# Patient Record
Sex: FEMALE | Race: WHITE | Employment: FULL TIME | URBAN - METROPOLITAN AREA
[De-identification: names, ages, dates, MRNs, and addresses within clinical notes are randomized per-mention and may not be internally consistent; named-entity substitution may affect disease eponyms.]

---

## 2021-10-28 ENCOUNTER — OFFICE VISIT (OUTPATIENT)
Dept: URGENT CARE | Facility: URGENT CARE | Age: 26
End: 2021-10-28
Payer: COMMERCIAL

## 2021-10-28 VITALS
OXYGEN SATURATION: 97 % | SYSTOLIC BLOOD PRESSURE: 112 MMHG | DIASTOLIC BLOOD PRESSURE: 72 MMHG | TEMPERATURE: 98.7 F | HEART RATE: 58 BPM

## 2021-10-28 DIAGNOSIS — S16.1XXA STRAIN OF NECK MUSCLE, INITIAL ENCOUNTER: Primary | ICD-10-CM

## 2021-10-28 PROCEDURE — 99203 OFFICE O/P NEW LOW 30 MIN: CPT | Performed by: PHYSICIAN ASSISTANT

## 2021-10-28 RX ORDER — NORETHINDRONE ACETATE AND ETHINYL ESTRADIOL .03; 1.5 MG/1; MG/1
TABLET ORAL
COMMUNITY
Start: 2020-11-23

## 2021-10-28 RX ORDER — IBUPROFEN 800 MG/1
800 TABLET, FILM COATED ORAL EVERY 8 HOURS PRN
Qty: 30 TABLET | Refills: 0 | Status: SHIPPED | OUTPATIENT
Start: 2021-10-28

## 2021-10-28 ASSESSMENT — ENCOUNTER SYMPTOMS: NECK PAIN: 1

## 2021-10-28 NOTE — PATIENT INSTRUCTIONS
Patient Education     Understanding Cervical Strain    There are 7 bones (vertebrae) in the neck that are part of the spine. These are called the cervical spine. Cervical strain is a medical term for neck pain. The neck has several layers of muscles. These are connected with tendons to the cervical spine and other bones. Neck pain is often the result of injury to these muscles and tendons.   Causes of cervical strain  Different types of stress on the neck can damage muscles and tendons (soft tissues) and cause cervical strain. Cervical tissues can be damaged by:     The neck being forced past its normal range of motion, such as in a car accident or sports injury    Constant, low-level stress, such as from poor posture or a poorly set-up workspace  Symptoms of cervical strain  These may include:    Neck pain or stiffness    Pain in the shoulders or upper back    Muscle spasms    Headache, often starting at the base of the neck    Irritability, trouble concentrating, or sleeplessness    Numbness in the arm or hand    Tingling or weakness in the arm  Treatment for cervical strain  This problem often gets better on its own. Treatments aim to reduce pain and inflammation and increase the range of motion of the neck. Possible treatments include:     Over-the-counter or prescription medicine. These help relieve pain and inflammation.    Muscle relaxant can help with muscle spasms.    Stretching exercises to decrease neck stiffness.    Massage to decrease neck stiffness.    Cold or heat pack. These help reduce pain and swelling.  Call 911  Call 911 right away if you have any of these:     Face drooping or numbness    Numbness or weakness, especially in the arms or on one side    Slurred speech or difficulty speaking    Blurred vision  When to call your healthcare provider  Call your healthcare provider right away if you have any of these:    Fever of 100.4 F (38 C) or higher, or as directed by your  provider    Chills    Pain or stiffness that gets worse    Symptoms that don t get better, or get worse    Numbness, tingling, weakness or shooting pains into the arms or legs    New symptoms  Lorena last reviewed this educational content on 6/1/2019 2000-2021 [employer]. All rights reserved. This information is not intended as a substitute for professional medical care. Always follow your healthcare professional's instructions.

## 2021-10-28 NOTE — PROGRESS NOTES
SUBJECTIVE:   Elena Tubbs is a 26 year old female presenting with a chief complaint of   Chief Complaint   Patient presents with     Urgent Care     Musculoskeletal Problem     x 4 days was lifting heavy stuff yesterday made it worse       She is a new patient of [facility].  Patient presents with right side neck pain x 4 days.  No arm pain.  No known problems with neck.  RHD.  No trauma.  Patient states she was lifting a mixing bowl.        Review of Systems   Musculoskeletal: Positive for neck pain.   All other systems reviewed and are negative.      History reviewed. No pertinent past medical history.  History reviewed. No pertinent family history.  Current Outpatient Medications   Medication Sig Dispense Refill     norethindrone-ethinyl estradiol (MICROGESTIN) 1.5-30 MG-MCG tablet        Social History     Tobacco Use     Smoking status: Never Smoker     Smokeless tobacco: Never Used   Substance Use Topics     Alcohol use: Not on file       OBJECTIVE  /72 (BP Location: Right arm, Patient Position: Sitting, Cuff Size: Adult Regular)   Pulse 58   Temp 98.7  F (37.1  C) (Oral)   SpO2 97%   Breastfeeding No     Physical Exam  Vitals and nursing note reviewed.   Constitutional:       General: She is not in acute distress.     Appearance: Normal appearance. She is normal weight. She is not ill-appearing, toxic-appearing or diaphoretic.   Eyes:      Extraocular Movements: Extraocular movements intact.      Conjunctiva/sclera: Conjunctivae normal.   Cardiovascular:      Rate and Rhythm: Normal rate.   Musculoskeletal:      Comments: Full ROM at the neck and bilateral shoulders.  No tenderness of the cervical bones.  UE strength equal and bilateral.  Tenderness to palpation of the right side rhomboid and trapezius.     Skin:     General: Skin is warm and dry.   Neurological:      General: No focal deficit present.      Mental Status: She is alert.   Psychiatric:         Mood and Affect: Mood normal.          Labs:  No results found for this or any previous visit (from the past 24 hour(s)).    X-Ray was not done.    ASSESSMENT:    No diagnosis found.     Medical Decision Making:    Differential Diagnosis:  Cervical strain    Serious Comorbid Conditions:  Adult:  reviewed    PLAN:  Rx for ibuprofen and zanaflex and PT.  Drink plenty of water.  Discussed reasons to seek immediate medical attention.  Additionally if no improvement or worsening in one week, may follow up with PCP and/or UC.        Followup:    If not improving or if condition worsens, follow up with your Primary Care Provider, If not improving or if conditions worsens over the next 12-24 hours, go to the Emergency Department    There are no Patient Instructions on file for this visit.

## 2021-10-29 ENCOUNTER — TELEPHONE (OUTPATIENT)
Dept: URGENT CARE | Facility: URGENT CARE | Age: 26
End: 2021-10-29

## 2021-10-29 NOTE — TELEPHONE ENCOUNTER
"Patient seen at [facility] by Daiana Brambila PAC on 10-    Rx'd tizanidine    Fax from pharmacy received with note:  \"Combination of microgestin birth control and tizanidine may increase side effects of tizanidine including: hypotension, bradycardia.  Is this safe for patient?  Please advise.\"  [facility]    Patient normally is seen outside of [facility] system ([facility] system, does not appear to have PCP, has OB/GYN)    UC providers - please review.      [facility] phone [phone]    RT Jean-Pierre (R)  [facility]  "

## 2021-10-29 NOTE — TELEPHONE ENCOUNTER
Pharmacy notified and request to continue filling prescription authorized.   Iman Rangel, DNP, APRN, CNP

## 2021-11-02 NOTE — TELEPHONE ENCOUNTER
Tizanidine Rx'd at UC visit 10-  OV notes:  PLAN:  Rx for ibuprofen and zanaflex and PT.  Drink plenty of water.  Discussed reasons to seek immediate medical attention.  Additionally if no improvement or worsening in one week, may follow up with PCP and/or UC.    Followup:   If not improving or if condition worsens, follow up with your Primary Care Provider, If not improving or if conditions worsens over the next 12-24 hours, go to the Emergency Department      Pharmacy seeking refill of tizanidine.  Fax sent back to pharmacy - patient to return to UC or contact PCP.    Emilee Trotter RT (R)

## 2025-01-25 ENCOUNTER — NURSE TRIAGE (OUTPATIENT)
Dept: NURSING | Facility: CLINIC | Age: 30
End: 2025-01-25
Payer: COMMERCIAL

## 2025-01-25 ENCOUNTER — OFFICE VISIT (OUTPATIENT)
Dept: URGENT CARE | Facility: URGENT CARE | Age: 30
End: 2025-01-25
Payer: COMMERCIAL

## 2025-01-25 VITALS
OXYGEN SATURATION: 98 % | TEMPERATURE: 95.9 F | HEART RATE: 87 BPM | WEIGHT: 180 LBS | SYSTOLIC BLOOD PRESSURE: 116 MMHG | DIASTOLIC BLOOD PRESSURE: 70 MMHG
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
## 2025-01-25 PROCEDURE — 99203 OFFICE O/P NEW LOW 30 MIN: CPT | Mod: 25

## 2025-01-25 PROCEDURE — 90471 IMMUNIZATION ADMIN: CPT

## 2025-01-25 PROCEDURE — 90715 TDAP VACCINE 7 YRS/> IM: CPT

## 2025-01-25 NOTE — PATIENT INSTRUCTIONS
Tetanus updated for you today in the clinic.  Take the antibiotic as prescribed and finish the full course even if symptoms improve.  Try yogurt with active cultures or probiotics such as Culturelle daily to help prevent diarrhea while using antibiotics.  Return to clinic with any increase in redness, swelling, drainage, bleeding, pain and/or fever/chills.

## 2025-01-25 NOTE — TELEPHONE ENCOUNTER
"Nurse Triage SBAR    Is this a 2nd Level Triage? NO    Situation:  Cat bite     Background: Pt reports \"just got a cat bite, my cat, has had all his shots\". Pt reports \"three puncture wounds, right wrist\". Cat was \"hissing at neighborhood stray, pushed him back\". Bleeding stopped. Occurred five minutes ago.     Assessment:  Cat bite, no risk for rabies     Protocol Recommended Disposition:   See HCP Within 4 Hours (Or PCP Triage)    Recommendation:  Per protocol advised pt to be seen in UC within four hours. Home care reviewed with pt.     Pt verbalizes understanding and agrees to plan.      Does the patient meet one of the following criteria for ADS visit consideration? 16+ years old, with an [facility] PCP     TIP  Providers, please consider if this condition is appropriate for management at one of our Acute and Diagnostic Services sites.     If patient is a good candidate, please use dotphrase <dot>triageresponse and select Refer to ADS to document.    Reason for Disposition   [1] Puncture wound (hole through the skin) AND [2] from a cat bite (or deep claw puncture wound)    Additional Information   Negative: [1] Major bleeding (e.g., actively dripping or spurting) AND [2] can't be stopped   Negative: Sounds like a life-threatening emergency to the triager   Negative: [1] Any break in skin from BITE (e.g., cut, puncture or scratch) AND[2] WILD animal at risk for RABIES (e.g., bat, raccoon, knapp, skunk, coyote, other carnivores; see Background for list.)   Negative: [1] Any break in skin from BITE (e.g., cut, puncture or scratch) AND[2] PET animal (e.g., dog, cat, or ferret) at risk for RABIES (e.g., sick, stray, unprovoked bite, developing country)   Negative: [1] Any break in skin from BITE (e.g., cut, puncture or scratch) AND[2] monkey   Negative: [1] EXPOSURE of non-intact skin (e.g., exposed person has dermatitis, abrasion, wound) AND[2] with animal BODY FLUID (e.g., saliva such as licking, blood, brain) AND[3] " animal at high-risk for RABIES (e.g., bat, raccoon, knapp, skunk, coyote, other carnivores)   Negative: [1] Cut (length > 1/8 inch or 3 mm) or skin tear AND [2] any animal  (Exception: Superficial scratch that doesn't go through dermis.)   Negative: [1] Bleeding AND [2] won't stop after 10 minutes of direct pressure (using correct technique)   Negative: Description of bite sounds severe to the triager    Protocols used: Animal Bite-A-AH

## 2025-01-25 NOTE — PROGRESS NOTES
ASSESSMENT:  (W55.01XA) Cat bite, initial encounter  (primary encounter diagnosis)  Plan: TDAP 7+ (ADACEL,BOOSTRIX),         amoxicillin-clavulanate (AUGMENTIN) 875-125 MG         tablet    PLAN:  Animal bite patient instructions discussed and provided.  Tetanus updated for the patient in the clinic today.  We discussed taking the antibiotic as prescribed and finishing the full course even if symptoms improve.  We also discussed trying yogurt with active cultures or probiotic such as Culturelle daily to help prevent diarrhea while taking the antibiotic.  Informed the patient to return to clinic with any increase in redness, swelling, drainage, bleeding, pain and/or fever/chills.  Patient acknowledged her understanding of the above plan.    The use of [employer] services may have been used to construct the content in this note; any grammatical or spelling errors are non-intentional. Please contact the author of this note directly if you are in need of any clarification.      Jamal Echevarria, APRN CNP    SUBJECTIVE:  Elena Tubbs is a 29 year old  female who presents with a chief complaint of an animal bite on the right lower arm.  The patient was bitten by a cat today.   Cicumstances of bite: unprovoked attack.  Animal's immunizations up to date  Associated symptoms: immediate pain    last tetanus booster 9 years ago    ROS:  Negative except noted above.     OBJECTIVE:  Blood pressure 116/70, pulse 87, temperature (!) 95.9  F (35.5  C), weight 81.6 kg (180 lb), last menstrual period 01/19/2025, SpO2 98%, not currently breastfeeding.   GENERAL: healthy, alert no acute distress  SKIN: abrasion and puncture wound of right lower arm.  There are six puncture wounds present.  The puncture wounds have surrounding erythema and edema.  No bleeding noted.  MS: full ROM.  CMS intact.